# Patient Record
Sex: MALE | Race: WHITE | Employment: FULL TIME | URBAN - METROPOLITAN AREA
[De-identification: names, ages, dates, MRNs, and addresses within clinical notes are randomized per-mention and may not be internally consistent; named-entity substitution may affect disease eponyms.]

---

## 2021-09-11 ENCOUNTER — APPOINTMENT (OUTPATIENT)
Dept: GENERAL RADIOLOGY | Age: 41
End: 2021-09-11
Attending: STUDENT IN AN ORGANIZED HEALTH CARE EDUCATION/TRAINING PROGRAM
Payer: COMMERCIAL

## 2021-09-11 ENCOUNTER — HOSPITAL ENCOUNTER (EMERGENCY)
Age: 41
Discharge: HOME | End: 2021-09-12
Attending: EMERGENCY MEDICINE
Payer: COMMERCIAL

## 2021-09-11 DIAGNOSIS — U07.1 COVID-19: ICD-10-CM

## 2021-09-11 DIAGNOSIS — R07.9 CHEST PAIN, UNSPECIFIED TYPE: Primary | ICD-10-CM

## 2021-09-11 LAB
ALBUMIN SERPL-MCNC: 4.1 G/DL (ref 3.5–5)
ALBUMIN/GLOB SERPL: 1.2 {RATIO} (ref 1.2–3.5)
ALP SERPL-CCNC: 73 U/L (ref 50–136)
ALT SERPL-CCNC: 37 U/L (ref 12–65)
ANION GAP SERPL CALC-SCNC: 6 MMOL/L (ref 7–16)
AST SERPL-CCNC: 21 U/L (ref 15–37)
BASOPHILS # BLD: 0 K/UL (ref 0–0.2)
BASOPHILS NFR BLD: 1 % (ref 0–2)
BILIRUB SERPL-MCNC: 0.3 MG/DL (ref 0.2–1.1)
BUN SERPL-MCNC: 18 MG/DL (ref 6–23)
CALCIUM SERPL-MCNC: 9.1 MG/DL (ref 8.3–10.4)
CHLORIDE SERPL-SCNC: 106 MMOL/L (ref 98–107)
CO2 SERPL-SCNC: 30 MMOL/L (ref 21–32)
COVID-19 RAPID TEST, COVR: DETECTED
CREAT SERPL-MCNC: 1.14 MG/DL (ref 0.8–1.5)
D DIMER PPP FEU-MCNC: <0.27 UG/ML(FEU)
DIFFERENTIAL METHOD BLD: ABNORMAL
EOSINOPHIL # BLD: 0.1 K/UL (ref 0–0.8)
EOSINOPHIL NFR BLD: 2 % (ref 0.5–7.8)
ERYTHROCYTE [DISTWIDTH] IN BLOOD BY AUTOMATED COUNT: 17.6 % (ref 11.9–14.6)
GLOBULIN SER CALC-MCNC: 3.5 G/DL (ref 2.3–3.5)
GLUCOSE SERPL-MCNC: 95 MG/DL (ref 65–100)
HCT VFR BLD AUTO: 39.3 % (ref 41.1–50.3)
HGB BLD-MCNC: 12.2 G/DL (ref 13.6–17.2)
IMM GRANULOCYTES # BLD AUTO: 0 K/UL (ref 0–0.5)
IMM GRANULOCYTES NFR BLD AUTO: 0 % (ref 0–5)
LIPASE SERPL-CCNC: 123 U/L (ref 73–393)
LYMPHOCYTES # BLD: 1.9 K/UL (ref 0.5–4.6)
LYMPHOCYTES NFR BLD: 65 % (ref 13–44)
MAGNESIUM SERPL-MCNC: 2.2 MG/DL (ref 1.8–2.4)
MCH RBC QN AUTO: 19.6 PG (ref 26.1–32.9)
MCHC RBC AUTO-ENTMCNC: 31 G/DL (ref 31.4–35)
MCV RBC AUTO: 63.3 FL (ref 79.6–97.8)
MONOCYTES # BLD: 0.3 K/UL (ref 0.1–1.3)
MONOCYTES NFR BLD: 11 % (ref 4–12)
NEUTS SEG # BLD: 0.6 K/UL (ref 1.7–8.2)
NEUTS SEG NFR BLD: 21 % (ref 43–78)
NRBC # BLD: 0 K/UL (ref 0–0.2)
PLATELET # BLD AUTO: 270 K/UL (ref 150–450)
PMV BLD AUTO: 9.5 FL (ref 9.4–12.3)
POTASSIUM SERPL-SCNC: 3.9 MMOL/L (ref 3.5–5.1)
PROT SERPL-MCNC: 7.6 G/DL (ref 6.3–8.2)
RBC # BLD AUTO: 6.21 M/UL (ref 4.23–5.6)
SODIUM SERPL-SCNC: 142 MMOL/L (ref 136–145)
SOURCE, COVRS: ABNORMAL
TROPONIN-HIGH SENSITIVITY: 66.9 PG/ML (ref 0–14)
TROPONIN-HIGH SENSITIVITY: 67.2 PG/ML (ref 0–14)
WBC # BLD AUTO: 3 K/UL (ref 4.3–11.1)

## 2021-09-11 PROCEDURE — 80053 COMPREHEN METABOLIC PANEL: CPT

## 2021-09-11 PROCEDURE — 74011250637 HC RX REV CODE- 250/637: Performed by: EMERGENCY MEDICINE

## 2021-09-11 PROCEDURE — 83735 ASSAY OF MAGNESIUM: CPT

## 2021-09-11 PROCEDURE — 84484 ASSAY OF TROPONIN QUANT: CPT

## 2021-09-11 PROCEDURE — M0243 CASIRIVI AND IMDEVI INFUSION: HCPCS

## 2021-09-11 PROCEDURE — 85025 COMPLETE CBC W/AUTO DIFF WBC: CPT

## 2021-09-11 PROCEDURE — 87635 SARS-COV-2 COVID-19 AMP PRB: CPT

## 2021-09-11 PROCEDURE — 85379 FIBRIN DEGRADATION QUANT: CPT

## 2021-09-11 PROCEDURE — 71046 X-RAY EXAM CHEST 2 VIEWS: CPT

## 2021-09-11 PROCEDURE — 93005 ELECTROCARDIOGRAM TRACING: CPT | Performed by: EMERGENCY MEDICINE

## 2021-09-11 PROCEDURE — 99285 EMERGENCY DEPT VISIT HI MDM: CPT

## 2021-09-11 PROCEDURE — 83690 ASSAY OF LIPASE: CPT

## 2021-09-11 RX ORDER — SODIUM CHLORIDE 0.9 % (FLUSH) 0.9 %
5-10 SYRINGE (ML) INJECTION EVERY 8 HOURS
Status: DISCONTINUED | OUTPATIENT
Start: 2021-09-11 | End: 2021-09-12 | Stop reason: HOSPADM

## 2021-09-11 RX ORDER — SODIUM CHLORIDE 0.9 % (FLUSH) 0.9 %
5-10 SYRINGE (ML) INJECTION AS NEEDED
Status: DISCONTINUED | OUTPATIENT
Start: 2021-09-11 | End: 2021-09-12 | Stop reason: HOSPADM

## 2021-09-11 RX ORDER — GUAIFENESIN 100 MG/5ML
324 LIQUID (ML) ORAL
Status: COMPLETED | OUTPATIENT
Start: 2021-09-11 | End: 2021-09-11

## 2021-09-11 RX ADMIN — ASPIRIN 324 MG: 81 TABLET, CHEWABLE ORAL at 23:27

## 2021-09-11 NOTE — LETTER
[address] [address] EMERGENCY DEPT  [address] [address] [phone]    Work/School Note    Date: 9/11/2021     To Whom It May concern:    Augustin German was evaulated by the following provider(s):  Attending Provider: Junior Espinoza, [address] [place] [address] virus is suspected. Per the CDC guidelines we recommend home isolation until the following conditions are all met:    1. At least 10 days have passed since symptoms first appeared and  2. At least 24 hours have passed since last fever without the use of fever-reducing medications and  3.  Symptoms (e.g., cough, shortness of breath) have improved    Sincerely,          Ariel Romeo RN

## 2021-09-12 VITALS
WEIGHT: 191.8 LBS | RESPIRATION RATE: 45 BRPM | SYSTOLIC BLOOD PRESSURE: 122 MMHG | DIASTOLIC BLOOD PRESSURE: 81 MMHG | HEART RATE: 43 BPM | BODY MASS INDEX: 24.62 KG/M2 | HEIGHT: 74 IN | TEMPERATURE: 98.7 F | OXYGEN SATURATION: 97 %

## 2021-09-12 LAB
ATRIAL RATE: 66 BPM
CALCULATED P AXIS, ECG09: 73 DEGREES
CALCULATED R AXIS, ECG10: 60 DEGREES
CALCULATED T AXIS, ECG11: 38 DEGREES
DIAGNOSIS, 93000: NORMAL
P-R INTERVAL, ECG05: 156 MS
Q-T INTERVAL, ECG07: 396 MS
QRS DURATION, ECG06: 92 MS
QTC CALCULATION (BEZET), ECG08: 415 MS
VENTRICULAR RATE, ECG03: 66 BPM

## 2021-09-12 PROCEDURE — 74011000636 HC RX REV CODE- 636: Performed by: EMERGENCY MEDICINE

## 2021-09-12 PROCEDURE — 74011000258 HC RX REV CODE- 258: Performed by: EMERGENCY MEDICINE

## 2021-09-12 RX ADMIN — CASIRIVIMAB AND IMDEVIMAB: 600; 600 INJECTION, SOLUTION, CONCENTRATE INTRAVENOUS at 01:49

## 2021-09-12 NOTE — ED NOTES
I have reviewed discharge instructions with the patient. The patient verbalized understanding. Patient left ED via Discharge Method: ambulatory to Home with  self). Opportunity for questions and clarification provided. Patient given 0 scripts. To continue your aftercare when you leave the hospital, you may receive an automated call from our care team to check in on how you are doing. This is a free service and part of our promise to provide the best care and service to meet your aftercare needs.  If you have questions, or wish to unsubscribe from this service please call [phone]. Thank you for Choosing our [address] [place] [address] Emergency Department.

## 2021-09-12 NOTE — DISCHARGE INSTRUCTIONS
Call your doctor for follow-up visit  Use tylenol and motrin for fever control.   Alternate doses of each at three hour intervals for temperature over 100.4 F  Return to ER for any worsening symptoms or new problems which may arise

## 2021-09-12 NOTE — ED TRIAGE NOTES
Chest pain since  Yesterday started feeling tight while fishing states this morning it hurt worse. Pt is positive covid.  Denies sob at present time and states that his chest hurts worse when he bends forward

## 2021-09-12 NOTE — ED NOTES
Pt presents to the ED for c/o chest pain that began yesterday after fishing.   States that he is COVID positive

## 2021-09-12 NOTE — ED PROVIDER NOTES
55-year-old male presents with complaint of worsening myalgias, subjective fever, chills, substernal chest pain since Tuesday. Patient states that on Tuesday and Wednesday he took home COVID-19 testing and states that both tests were positive. Patient states that he did not receive this with testing from a lab/hospital or [facility], etc.  Patient reports history of hyperlipidemia. Denies history of hypertension, CAD. Reports history of previous stress test which was unremarkable. Patient did not receive COVID-19 vaccine. Patient denies abdominal pain, hemoptysis, lower extremity swelling or pain, productive cough. Denies history of tobacco use. The history is provided by the patient. No  was used. Chest Pain (Angina)   This is a new problem. The current episode started more than 2 days ago. The problem occurs daily. The pain is associated with normal activity. The pain is present in the substernal region. The pain is at a severity of 5/10. The pain is moderate. The quality of the pain is described as pressure-like. The pain does not radiate. Associated symptoms include cough, malaise/fatigue and nausea. Pertinent negatives include no abdominal pain, no back pain, no claudication, no diaphoresis, no dizziness, no exertional chest pressure, no fever, no headaches, no hemoptysis, no irregular heartbeat, no leg pain, no lower extremity edema, no near-syncope, no numbness, no orthopnea, no palpitations, no PND, no shortness of breath, no sputum production, no vomiting and no weakness. He has tried nothing for the symptoms. The treatment provided no relief. No past medical history on file. No past surgical history on file. No family history on file.     Social History     Socioeconomic History    Marital status:      Spouse name: Not on file    Number of children: Not on file    Years of education: Not on file    Highest education level: Not on file   Occupational History  Not on file   Tobacco Use    Smoking status: Not on file   Substance and Sexual Activity    Alcohol use: Not on file    Drug use: Not on file    Sexual activity: Not on file   Other Topics Concern    Not on file   Social History Narrative    Not on file     Social Determinants of Health     Financial Resource Strain:     Difficulty of Paying Living Expenses:    Food Insecurity:     Worried About Running Out of Food in the Last Year:     [address] in the Last Year:    Transportation Needs:     Lack of Transportation (Medical):  Lack of Transportation (Non-Medical):    Physical Activity:     Days of Exercise per Week:     Minutes of Exercise per Session:    Stress:     Feeling of Stress :    Social Connections:     Frequency of Communication with Friends and Family:     Frequency of Social Gatherings with Friends and Family:     Attends [employer] Services:     Active Member of Clubs or Organizations:     Attends Club or Organization Meetings:     Marital Status:    Intimate Partner Violence:     Fear of Current or Ex-Partner:     Emotionally Abused:     Physically Abused:     Sexually Abused: ALLERGIES: Patient has no known allergies. Review of Systems   Constitutional: Positive for fatigue and malaise/fatigue. Negative for diaphoresis and fever. HENT: Negative for rhinorrhea, sore throat, trouble swallowing and voice change. Respiratory: Positive for cough. Negative for hemoptysis, sputum production, shortness of breath and wheezing. Cardiovascular: Positive for chest pain. Negative for palpitations, orthopnea, claudication, leg swelling, PND and near-syncope. Gastrointestinal: Positive for nausea. Negative for abdominal pain, diarrhea and vomiting. Genitourinary: Negative for dysuria and flank pain. Musculoskeletal: Positive for myalgias. Negative for arthralgias, back pain, neck pain and neck stiffness. Skin: Negative for color change and rash. Neurological: Negative for dizziness, syncope, weakness, light-headedness, numbness and headaches. Hematological: Does not bruise/bleed easily. Psychiatric/Behavioral: Negative for confusion. Vitals:    09/11/21 2042   BP: 133/86   Pulse: 75   Resp: 18   Temp: 98.7 °F (37.1 °C)   SpO2: 98%   Weight: 83.9 kg (185 lb)   Height: 6' 2\" (1.88 m)            Physical Exam  Vitals and nursing note reviewed. Constitutional:       Appearance: He is well-developed. HENT:      Head: Normocephalic. Eyes:      Extraocular Movements: Extraocular movements intact. Pupils: Pupils are equal, round, and reactive to light. Cardiovascular:      Rate and Rhythm: Normal rate and regular rhythm. Heart sounds: Normal heart sounds. Comments: Pulses 2+ and equal throughout. Pulmonary:      Effort: Pulmonary effort is normal.      Breath sounds: Normal breath sounds. Comments: CTAB. Abdominal:      General: Bowel sounds are normal.      Palpations: Abdomen is soft. Tenderness: There is no abdominal tenderness. Comments: Soft, nontender, nondistended. No rebound or guarding. Musculoskeletal:         General: Normal range of motion. Right lower leg: No edema. Left lower leg: No edema. Comments: No calf tenderness noted. Skin:     General: Skin is warm. Findings: No rash. Neurological:      General: No focal deficit present. Mental Status: He is alert and oriented to person, place, and time. Motor: No weakness. Comments: No focal deficits. MDM  Number of Diagnoses or Management Options  Chest pain, unspecified type: new and requires workup  Diagnosis management comments: Vital signs stable. Initial troponin noted to be elevated at 67.2. EKG with normal sinus rhythm. No ischemic changes noted. Heart rate 66. Chest x-ray with no acute or concerning findings. White blood cell count 3.0. Rapid Covid pending. Will add on D-dimer. Aspirin ordered. Patient states that pain occurs with sitting up and laying back. States that the pain was so severe he could hardly put his shoes on today.  =======================================  Handoff given to Dr. Rodney Pederson to follow-up on repeat 2-hour troponin. D-dimer unremarkable. Additionally rapid Covid swab pending. Chest pain is more positional in nature concerning for pericarditis. No EKG findings concerning for pericarditis. If troponin remains stable no additional chest pain plan on discharge home with close follow-up with cardiology, PCP. Amount and/or Complexity of Data Reviewed  Clinical lab tests: ordered and reviewed  Tests in the radiology section of CPT®: ordered and reviewed  Tests in the medicine section of CPT®: ordered and reviewed  Review and summarize past medical records: yes  Discuss the patient with other providers: yes  Independent visualization of images, tracings, or specimens: yes    Risk of Complications, Morbidity, and/or Mortality  Presenting problems: moderate  Diagnostic procedures: moderate  Management options: moderate    Patient Progress  Patient progress: stable    ED Course as of Sep 11 2322   Sat Sep 11, 2021   2229 WBC(!): 3.0 [DF]   2229 Troponin-High Sensitivity(!): 67.2 [DF]   2229 CXR IMPRESSION:   No acute findings in the chest    [DF]   2320 D DIMER: <0.27 [DF]      ED Course User Index  [DF] Adri Ojeda MD       EKG    Date/Time: 9/11/2021 10:36 PM  Performed by: Adri Ojeda MD  Authorized by:  Adri Ojeda MD     ECG reviewed by ED Physician in the absence of a cardiologist: yes    Rate:     ECG rate:  66    ECG rate assessment: normal    Rhythm:     Rhythm: sinus rhythm    Ectopy:     Ectopy: none    QRS:     QRS axis:  Normal    QRS intervals:  Normal  Conduction:     Conduction: normal    ST segments:     ST segments:  Normal  T waves:     T waves: normal                     Results Include:    Recent Results (from the past 24 hour(s))   TROPONIN-HIGH SENSITIVITY    Collection Time: 09/11/21  8:53 PM   Result Value Ref Range    Troponin-High Sensitivity 67.2 (H) 0 - 14 pg/mL   CBC WITH AUTOMATED DIFF    Collection Time: 09/11/21  8:53 PM   Result Value Ref Range    WBC 3.0 (L) 4.3 - 11.1 K/uL    RBC 6.21 (H) 4.23 - 5.6 M/uL    HGB 12.2 (L) 13.6 - 17.2 g/dL    HCT 39.3 (L) 41.1 - 50.3 %    MCV 63.3 (L) 79.6 - 97.8 FL    MCH 19.6 (L) 26.1 - 32.9 PG    MCHC 31.0 (L) 31.4 - 35.0 g/dL    RDW 17.6 (H) 11.9 - 14.6 %    PLATELET 955 898 - 222 K/uL    MPV 9.5 9.4 - 12.3 FL    ABSOLUTE NRBC 0.00 0.0 - 0.2 K/uL    DF AUTOMATED      NEUTROPHILS 21 (L) 43 - 78 %    LYMPHOCYTES 65 (H) 13 - 44 %    MONOCYTES 11 4.0 - 12.0 %    EOSINOPHILS 2 0.5 - 7.8 %    BASOPHILS 1 0.0 - 2.0 %    IMMATURE GRANULOCYTES 0 0.0 - 5.0 %    ABS. NEUTROPHILS 0.6 (L) 1.7 - 8.2 K/UL    ABS. LYMPHOCYTES 1.9 0.5 - 4.6 K/UL    ABS. MONOCYTES 0.3 0.1 - 1.3 K/UL    ABS. EOSINOPHILS 0.1 0.0 - 0.8 K/UL    ABS. BASOPHILS 0.0 0.0 - 0.2 K/UL    ABS. IMM. GRANS. 0.0 0.0 - 0.5 K/UL   METABOLIC PANEL, COMPREHENSIVE    Collection Time: 09/11/21  8:53 PM   Result Value Ref Range    Sodium 142 136 - 145 mmol/L    Potassium 3.9 3.5 - 5.1 mmol/L    Chloride 106 98 - 107 mmol/L    CO2 30 21 - 32 mmol/L    Anion gap 6 (L) 7 - 16 mmol/L    Glucose 95 65 - 100 mg/dL    BUN 18 6 - 23 MG/DL    Creatinine 1.14 0.8 - 1.5 MG/DL    GFR est AA >60 >60 ml/min/1.73m2    GFR est non-AA >60 >60 ml/min/1.73m2    Calcium 9.1 8.3 - 10.4 MG/DL    Bilirubin, total 0.3 0.2 - 1.1 MG/DL    ALT (SGPT) 37 12 - 65 U/L    AST (SGOT) 21 15 - 37 U/L    Alk.  phosphatase 73 50 - 136 U/L    Protein, total 7.6 6.3 - 8.2 g/dL    Albumin 4.1 3.5 - 5.0 g/dL    Globulin 3.5 2.3 - 3.5 g/dL    A-G Ratio 1.2 1.2 - 3.5     LIPASE    Collection Time: 09/11/21  8:53 PM   Result Value Ref Range    Lipase 123 73 - 393 U/L   MAGNESIUM    Collection Time: 09/11/21  8:53 PM   Result Value Ref Range    Magnesium 2.2 1.8 - 2.4 mg/dL     Dave Finn MD; 9/11/2021 @10:38 PM Voice dictation software was used during the making of this note. This software is not perfect and grammatical and other typographical errors may be present.   This note has not been proofread for errors.  ===================================================================

## 2022-09-08 NOTE — PROGRESS NOTES
[address] [place] [address] Court, [address] [facility]  [phone]    Patient Name:  Michelle Carreon  YOB: 1980      Office Visit 9/9/2022    CHIEF COMPLAINT:    Chief Complaint   Patient presents with    New Patient    Snoring       HISTORY OF PRESENT ILLNESS:      The patient presents in outpatient consultation at the request of Dr. Tamra Villareal for evaluation of possible sleep apnea. Significant PMH of ADHD, atherosclerosis, PVD, and beta thalassemia. He had overnight oximetry study on 7/22/2022 was positive for oxygen saturations less than 88% for 2 minutes and 18 seconds and saturations less than 89% for 3 minutes 40 seconds. Lowest saturation was 80%. Patient reports that he did have a sleep study done years ago and was told that he had mild sleep apnea but no treatment was ever suggested or started. Reports history of his wife telling him that he snores. He denies any insomnia or fatigue during the day. Abie score is 5/24. He does have history of ADHD and has been on Adderall 20 mg extended release daily for years. This is prescribed by his primary provider. He states he has been doing research on sleep of his own for a long time now and has tried many different things to include reading a book which suggested mouth taping and trying some online dental appliances. Advised patient that I did not recommend mouth taping due to risk of asphyxia. He reports that he does travel a lot and does not feel that CPAP therapy if he had sleep apnea would be at treatment option for him. States his normal bedtime hours are from about 11 PM until 7 AM.  He does report having problems with waking during the night about 4-5 times per night. He has tried melatonin in the past but did not like the way that it made him feel the next day. He currently will occasionally take an herbal sleep aid that does help with restorative sleep.   He denies any excessive movement of his legs or body while sleeping. States that he never sleeps on his back, he always sleeps on his sides. Reports that he may have 1 or 2 alcoholic drinks 3-4 times per week. Denies any tobacco use or illicit drug use. Reports that when he had the overnight oximetry study he was having some stress issues with life and was sleeping in an uncomfortable environment that may have contributed to the results. He states he has since gotten his stress levels under control and has started working out over the last 4 months and has lost 18 pounds. He was congratulated on this. He is also working on his sleep hygiene and making sure that he has a comfortable bed ,no televisions, and no cellular devices while trying  to go to sleep. His blood pressure is controlled today. Sleep Medicine 9/9/2022   Sitting and reading 0   Watching TV 2   Sitting, inactive in a public place (e.g. a theatre or a meeting) 0   As a passenger in a car for an hour without a break 0   Lying down to rest in the afternoon when circumstances permit 2   Sitting and talking to someone 0   Sitting quietly after a lunch without alcohol 1   In a car, while stopped for a few minutes in traffic 0   [place] Sleepiness Score 5        History reviewed. No pertinent past medical history. There is no problem list on file for this patient. History reviewed. No pertinent surgical history.       Social History     Socioeconomic History    Marital status:      Spouse name: Not on file    Number of children: Not on file    Years of education: Not on file    Highest education level: Not on file   Occupational History    Not on file   Tobacco Use    Smoking status: Never    Smokeless tobacco: Never   Substance and Sexual Activity    Alcohol use: Not on file    Drug use: Not on file    Sexual activity: Not on file   Other Topics Concern    Not on file   Social History Narrative    Not on file     Social Determinants of Health Financial Resource Strain: Not on file   Food Insecurity: Not on file   Transportation Needs: Not on file   Physical Activity: Not on file   Stress: Not on file   Social Connections: Not on file   Intimate Partner Violence: Not on file   Housing Stability: Not on file         History reviewed. No pertinent family history. No Known Allergies      Current Outpatient Medications   Medication Sig    amphetamine-dextroamphetamine (ADDERALL XR) 20 MG extended release capsule dextroamphetamine-amphetamine ER 20 mg 24hr capsule,extend release   TAKE 1 CAPSULE BY MOUTH EVERY DAY IN THE MORNING    fenofibrate (TRICOR) 145 MG tablet fenofibrate nanocrystallized 145 mg tablet   TAKE 1 TABLET BY MOUTH EVERY DAY IN THE EVENING FOR 90 DAYS     No current facility-administered medications for this visit. REVIEW OF SYSTEMS:   CONSTITUTIONAL:   There is no history of fever, chills, night sweats, weight loss, weight gain, persistent fatigue, or lethargy/hypersomnolence. EYES:   Denies problems with eye pain, erythema, blurred vision, or visual field loss. ENTM:   Denies history of tinnitus, epistaxis, sore throat, hoarseness, or dysphonia. LYMPH:   Denies swollen glands. CARDIAC:   No chest pain, pressure, discomfort, palpitations, orthopnea, murmurs, or edema. GI:   No dysphagia, heartburn reflux, nausea/vomiting, diarrhea, abdominal pain, or bleeding. :   Denies history of dysuria, hematuria, polyuria, or decreased urine output. MS:   No history of myalgias, arthralgias, bone pain, or muscle cramps. SKIN:   No history of rashes, jaundice, cyanosis, nodules, or ulcers. ENDO:   Negative for heat or cold intolerance. No history of DM. PSYCH:   Negative for anxiety, depression, insomnia, hallucinations. NEURO:   There is no history of AMS, persistent headache, decreased level of consciousness, seizures, or motor or sensory deficits.       PHYSICAL EXAM:    Vitals:    09/09/22 1111   BP: 118/76 Pulse: 89   Resp: 14   Temp: 97.1 °F (36.2 °C)   SpO2: 97%   Weight: 196 lb 6.4 oz (89.1 kg)   Height: 6' 1\" (1.854 m)        GENERAL APPEARANCE:   The patient is normal weight and in no respiratory distress. HEENT:   PERRL. Conjunctivae unremarkable. Nasal mucosa is without epistaxis, exudate, or polyps. Nares patent bilateral.  Gums and dentition are unremarkable. There is oropharyngeal narrowing. [place] score 1. NECK/LYMPHATIC:   Symmetrical with no elevation of jugular venous pulsation. Trachea midline. No thyroid enlargement. No cervical adenopathy. LUNGS:   Normal respiratory effort with symmetrical lung expansion. Breath sounds clear. HEART:   There is a regular rate and rhythm. No murmur, rub, or gallop. There is no edema in the lower extremities. ABDOMEN:   Soft and non-tender. No hepatosplenomegaly. Bowel sounds are normal.     SKIN:   There are no rashes, cyanosis, jaundice, or ecchymosis present. EXTREMITIES:   The extremities are unremarkable without clubbing, cyanosis, joint inflammation, degenerative, or ischemic change. MUSCULOSKELETAL:   There is no abnormal tone, muscle atrophy, or abnormal movement present. NEURO:   The patient is alert and oriented to person, place, and time. Memory appears intact and mood is normal.  No gross sensorimotor deficits are present. ASSESSMENT:  (Medical Decision Making)         ICD-10-CM    1. Suspected sleep apnea  R29.818 Ambulatory Referral to Sleep Studies - The pathophysiology of obstructive sleep apnea was reviewed with the patient. It's potential to promote severe neurologic, cardiac, pulmonary, and gastrointestinal problems was discussed.  Specifically, the increased incidence of hypertension, coronary artery disease, congestive heart failure, pulmonary hypertension, gastroesophageal reflux, pathologic hypersomnolence, memory loss, and glucose intolerance was related to the consequences of hypoxemia, hypercapnia, airway obstruction, and sympathetic overdrive. We also discussed the ability of nasal CPAP to correct these abnormalities through maintenance of a patent airway. Therapeutic options including surgery, oral appliances, and weight loss were also reviewed. Patient would like to get sleep study completed and rediscuss treatment options if positive for sleep apnea based upon the severity. 2. Non-restorative sleep  G47.8 Ambulatory Referral to Sleep Studies - continue herbal sleep aid if it works well but take it at bedtime to help with nonrestorative sleep. Use body pillow to help with positional therapy to help maintain side-lying position. 3. Nocturnal hypoxemia  G47.34 Ambulatory Referral to Sleep Studies -previous overnight oximetry study showed some mild hypoxemia, will do home sleep study done. 4. Snoring  R06.83 Use body pillow to help with positional therapy to help maintain side-lying position. PLAN:    Continue to work on sleep hygiene and positional therapy  Appropriate handouts were given to patient including information on sleep apnea, sleep hygiene and PAP therapy. Follow-up after sleep study to discuss treatment options in further detail based upon study results. Patient would like to get sleep study completed and rediscuss treatment options if positive for sleep apnea based upon the severity. Orders Placed This Encounter   Procedures    Ambulatory Referral to Sleep Studies     Referral Priority:   Routine     Referral Type:   Consult for Advice and Opinion     Referral Reason:   Specialty Services Required     Number of Visits Requested:   1                Collaborating Physician: Dr. Randell Horton     Over 50% of today's office visit was spent in face to face time reviewing test results, prognosis, importance of compliance, education about disease process, benefits of medications, instructions for management of acute flare-ups, and follow up plans.   Total face to face time spent with patient was 40 minutes.     [address], APRN - CNP  Electronically signed

## 2022-09-09 ENCOUNTER — OFFICE VISIT (OUTPATIENT)
Dept: SLEEP MEDICINE | Age: 42
End: 2022-09-09
Payer: COMMERCIAL

## 2022-09-09 VITALS
WEIGHT: 196.4 LBS | BODY MASS INDEX: 26.03 KG/M2 | DIASTOLIC BLOOD PRESSURE: 76 MMHG | HEART RATE: 89 BPM | TEMPERATURE: 97.1 F | SYSTOLIC BLOOD PRESSURE: 118 MMHG | RESPIRATION RATE: 14 BRPM | OXYGEN SATURATION: 97 % | HEIGHT: 73 IN

## 2022-09-09 DIAGNOSIS — R06.83 SNORING: ICD-10-CM

## 2022-09-09 DIAGNOSIS — G47.8 NON-RESTORATIVE SLEEP: ICD-10-CM

## 2022-09-09 DIAGNOSIS — G47.34 NOCTURNAL HYPOXEMIA: ICD-10-CM

## 2022-09-09 DIAGNOSIS — R29.818 SUSPECTED SLEEP APNEA: Primary | ICD-10-CM

## 2022-09-09 PROCEDURE — 99203 OFFICE O/P NEW LOW 30 MIN: CPT | Performed by: NURSE PRACTITIONER

## 2022-09-09 RX ORDER — FENOFIBRATE 145 MG/1
TABLET, COATED ORAL
COMMUNITY

## 2022-09-09 RX ORDER — DEXTROAMPHETAMINE SACCHARATE, AMPHETAMINE ASPARTATE MONOHYDRATE, DEXTROAMPHETAMINE SULFATE AND AMPHETAMINE SULFATE 5; 5; 5; 5 MG/1; MG/1; MG/1; MG/1
CAPSULE, EXTENDED RELEASE ORAL
COMMUNITY

## 2022-09-09 ASSESSMENT — SLEEP AND FATIGUE QUESTIONNAIRES
HOW LIKELY ARE YOU TO NOD OFF OR FALL ASLEEP WHILE SITTING AND TALKING TO SOMEONE: 0
HOW LIKELY ARE YOU TO NOD OFF OR FALL ASLEEP IN A CAR, WHILE STOPPED FOR A FEW MINUTES IN TRAFFIC: 0
HOW LIKELY ARE YOU TO NOD OFF OR FALL ASLEEP WHILE SITTING INACTIVE IN A PUBLIC PLACE: 0
ESS TOTAL SCORE: 5
HOW LIKELY ARE YOU TO NOD OFF OR FALL ASLEEP WHILE WATCHING TV: 2
HOW LIKELY ARE YOU TO NOD OFF OR FALL ASLEEP WHILE SITTING AND READING: 0
HOW LIKELY ARE YOU TO NOD OFF OR FALL ASLEEP WHILE SITTING QUIETLY AFTER LUNCH WITHOUT ALCOHOL: 1
HOW LIKELY ARE YOU TO NOD OFF OR FALL ASLEEP WHILE LYING DOWN TO REST IN THE AFTERNOON WHEN CIRCUMSTANCES PERMIT: 2
HOW LIKELY ARE YOU TO NOD OFF OR FALL ASLEEP WHEN YOU ARE A PASSENGER IN A CAR FOR AN HOUR WITHOUT A BREAK: 0

## 2022-09-09 NOTE — PATIENT INSTRUCTIONS
Continue to work on sleep hygiene and positional therapy  Follow-up after sleep study to discuss treatment options in further detail based upon study results. What is a Home Sleep Test?    A home sleep apnea test is just as it sounds; a sleep test your patients can take from their own home to determine if they have sleep apnea. Your patient may come into the sleep center to  equipment for their home sleep test. Azael Wade demonstrate to them how to perform the test, including how to use the machine and equipment properly. The patient goes home and performs the test.  The data obtained is stored on the equipment and is uploaded at the sleep center the following day. Home sleep tests are designed to provide your patients with a convenient way to get the test done while allowing you to collect important information on how they sleep and make a diagnosis of DIOMEDES. A home sleep test comes with devices that your patient can easily apply themselves and includes:    A belt that measures respiratory effort and is placed around the upper chest.    A small nasal cannula that measures airflow    A finger clip that measures blood oxygen saturation      What is the process and what happens during a Home Sleep Test?    On the day your patient plans on taking the test, they should:    Avoid napping    Follow their normal routine as much as they can. Avoid caffeine after they eat lunch. They should let you know if they're taking regular medication, since the physician may want to have them discontinue it temporarily, depending on what it is. Before the patient's home sleep test, they'll come by sleep clinic to  the equipment. They can also opt to have hit delivered to their home. Provide them with instructions on how to use the home test and equipment and be ready for any questions they may have on things they don't understand. Let them know they can go to bed at their regular time.   When they're ready to go to sleep, they'll attach the sensors to their body like you instructed. They may need to press a button on the device when they're getting into bed, and most patients are asked to keep a sleep log. The sensors typically include a sensor that slips over the patient's index finger to monitor their oxygen levels and heart rate, and another set of sensors that monitors their breathing patterns. Your patient straps these sensors on their chest and places a small tube similar to an oxygen cannula in their nostrils to measure airflow through their nose. When your patient wakes up the following morning, they'll remove the sensors. They either return the device back through the mail or bring it to the sleep clinic.

## 2022-11-28 ENCOUNTER — HOSPITAL ENCOUNTER (OUTPATIENT)
Dept: SLEEP CENTER | Age: 42
Discharge: HOME | End: 2022-12-01

## 2022-12-09 ENCOUNTER — TELEPHONE (OUTPATIENT)
Dept: SLEEP MEDICINE | Age: 42
End: 2022-12-09

## 2022-12-09 NOTE — TELEPHONE ENCOUNTER
Patient needs return appt/ [facility] -  16.6     Lowest SaO2- 87% to  discuss all options        Forward to schedulers